# Patient Record
Sex: MALE | NOT HISPANIC OR LATINO
[De-identification: names, ages, dates, MRNs, and addresses within clinical notes are randomized per-mention and may not be internally consistent; named-entity substitution may affect disease eponyms.]

---

## 2024-05-13 ENCOUNTER — APPOINTMENT (OUTPATIENT)
Dept: ORTHOPEDIC SURGERY | Facility: CLINIC | Age: 54
End: 2024-05-13
Payer: COMMERCIAL

## 2024-05-13 VITALS — BODY MASS INDEX: 33.9 KG/M2 | WEIGHT: 216 LBS | HEIGHT: 67 IN

## 2024-05-13 DIAGNOSIS — Z78.9 OTHER SPECIFIED HEALTH STATUS: ICD-10-CM

## 2024-05-13 PROBLEM — Z00.00 ENCOUNTER FOR PREVENTIVE HEALTH EXAMINATION: Status: ACTIVE | Noted: 2024-05-13

## 2024-05-13 PROCEDURE — 72170 X-RAY EXAM OF PELVIS: CPT

## 2024-05-13 PROCEDURE — 99203 OFFICE O/P NEW LOW 30 MIN: CPT

## 2024-05-13 PROCEDURE — 72110 X-RAY EXAM L-2 SPINE 4/>VWS: CPT

## 2024-05-13 RX ORDER — OMEPRAZOLE MAGNESIUM 20 MG/1
20 TABLET, DELAYED RELEASE ORAL
Refills: 0 | Status: ACTIVE | COMMUNITY

## 2024-05-13 NOTE — ASSESSMENT
[FreeTextEntry1] : Several years of lower back pain with right lower extremity radicular symptoms previously not evaluated.  Exam consistent with right lower extremity radicular complaints.  X-rays of the lumbar spine showing significant degenerative disc disease between L3 and L5. - PT referral - MRI of the lumbar spine given severity of radicular complaints and chronicity - Offered anti-inflammatories.  States did not help and he does not want any. - Home exercise plan given - Follow-up after MRI for review

## 2024-05-13 NOTE — PHYSICAL EXAM
[de-identified] : Constitutional: Well developed, well nourished, able to communicate Neuro: Normal sensation, No focal deficits Skin: Intact CV: Peripheral vascular exam grossly normal Pulm: No signs of respiratory distress Psych: Oriented, normal mood and affect

## 2024-05-13 NOTE — IMAGING
[de-identified] : Back: - No obvious deformity - No pain with palpation of spinous processes or paraspinal musculature - No pain with SI palpation  - ROM intact throughout flexion, extension, sidebending, and rotation - 5/5 strength throughout LE evaluation bilaterally - No pain with PIERRE - Positive straight leg raise on the R. Significant hamstring tightness bilaterally.  - Distally neurovascularly intact [Facet arthropathy] : Facet arthropathy [Disc space narrowing] : Disc space narrowing [Scoliosis] : Scoliosis [AP] : anteroposterior [There are no fractures, subluxations or dislocations. No significant abnormalities are seen] : There are no fractures, subluxations or dislocations. No significant abnormalities are seen

## 2024-05-13 NOTE — HISTORY OF PRESENT ILLNESS
[Right Leg] : right leg [9] : 9 [Radiating] : radiating [Sharp] : sharp [Intermittent] : intermittent [Sleep] : sleep [Full time] : Work status: full time [de-identified] : 05/13/2024: Patient is a 54 year y.o. male presenting for evaluation of lower back and R leg pain, patient Staes the pain has been over the course of 3 to 4 years more persistent recently, problems at night. No injury involved. The pain quality has gotten worse. pt reports being woken up out of sleep due to pain. pain comes and goes with activity some good days some very bad. radiating down to the foot. patient takes Advil for pain, which doesn't improve quality of pain. no prior treatment or imaging. some numbness and tingling occasionally mainly at night. no swelling. non smoker.  work -  exercise - bike and walk. 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

## 2024-05-20 ENCOUNTER — APPOINTMENT (OUTPATIENT)
Dept: MRI IMAGING | Facility: CLINIC | Age: 54
End: 2024-05-20
Payer: COMMERCIAL

## 2024-05-20 PROCEDURE — 72148 MRI LUMBAR SPINE W/O DYE: CPT

## 2024-06-03 ENCOUNTER — APPOINTMENT (OUTPATIENT)
Dept: ORTHOPEDIC SURGERY | Facility: CLINIC | Age: 54
End: 2024-06-03

## 2024-06-05 ENCOUNTER — APPOINTMENT (OUTPATIENT)
Dept: ORTHOPEDIC SURGERY | Facility: CLINIC | Age: 54
End: 2024-06-05
Payer: COMMERCIAL

## 2024-06-05 DIAGNOSIS — M47.27 OTHER SPONDYLOSIS WITH RADICULOPATHY, LUMBOSACRAL REGION: ICD-10-CM

## 2024-06-05 PROCEDURE — 99213 OFFICE O/P EST LOW 20 MIN: CPT

## 2024-06-05 NOTE — DATA REVIEWED
[MRI] : MRI [Lower Extremities] : lower extremities [Report was reviewed and noted in the chart] : The report was reviewed and noted in the chart [I reviewed the films/CD and agree] : I reviewed the films/CD and agree

## 2024-06-05 NOTE — HISTORY OF PRESENT ILLNESS
[Right Leg] : right leg [9] : 9 [Radiating] : radiating [Sharp] : sharp [Intermittent] : intermittent [Sleep] : sleep [Full time] : Work status: full time [de-identified] : 06/05/2024 :patient is here to discuss MRI results. patient states since last visit he been the same. continues to take advil as needed. pain is still radiating down to foot intermittently. Tried a few home exercises. Did not do any PT.   05/13/2024: Patient is a 54 year y.o. male presenting for evaluation of lower back and R leg pain, patient Staes the pain has been over the course of 3 to 4 years more persistent recently, problems at night. No injury involved. The pain quality has gotten worse. pt reports being woken up out of sleep due to pain. pain comes and goes with activity some good days some very bad. radiating down to the foot. patient takes Advil for pain, which doesn't improve quality of pain. no prior treatment or imaging. some numbness and tingling occasionally mainly at night. no swelling. non smoker.  work -  exercise - bike and walk. 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

## 2024-06-05 NOTE — IMAGING
[Facet arthropathy] : Facet arthropathy [Disc space narrowing] : Disc space narrowing [Scoliosis] : Scoliosis [AP] : anteroposterior [There are no fractures, subluxations or dislocations. No significant abnormalities are seen] : There are no fractures, subluxations or dislocations. No significant abnormalities are seen [de-identified] : Back: - No obvious deformity - No pain with palpation of spinous processes or paraspinal musculature - No pain with SI palpation  - ROM intact throughout flexion, extension, sidebending, and rotation - 5/5 strength throughout LE evaluation bilaterally - No pain with PIERRE - Positive straight leg raise on the R. Significant hamstring tightness bilaterally.  - Distally neurovascularly intact

## 2024-06-05 NOTE — PHYSICAL EXAM
[de-identified] : Constitutional: Well developed, well nourished, able to communicate Neuro: Normal sensation, No focal deficits Skin: Intact CV: Peripheral vascular exam grossly normal Pulm: No signs of respiratory distress Psych: Oriented, normal mood and affect

## 2024-06-05 NOTE — ASSESSMENT
[FreeTextEntry1] : Several years of lower back pain with right lower extremity radicular symptoms previously not evaluated.  Exam consistent with right lower extremity radicular complaints.  X-rays of the lumbar spine showing significant degenerative disc disease between L3 and L5. MRI showing L4-L5 right sided worse neuroforaminal narrowing likely causing his symptoms.  - PT referral - Offered anti-inflammatories and gabapentin, he declined - Discussed potential for future pain management or spine referral which he does not want at this time - Follow up in 6-8 weeks as needed